# Patient Record
Sex: FEMALE | Race: WHITE | NOT HISPANIC OR LATINO | Employment: OTHER | URBAN - NONMETROPOLITAN AREA
[De-identification: names, ages, dates, MRNs, and addresses within clinical notes are randomized per-mention and may not be internally consistent; named-entity substitution may affect disease eponyms.]

---

## 2019-12-27 ENCOUNTER — TELEPHONE (OUTPATIENT)
Dept: NUCLEAR MEDICINE | Facility: HOSPITAL | Age: 69
End: 2019-12-27

## 2019-12-27 NOTE — TELEPHONE ENCOUNTER
A reminder call was performed on 12/27 for the patients nuclear medicine stress test on 12/30. Patient was given the time, date, location and prep for the exam. Patient understood.

## 2019-12-30 ENCOUNTER — HOSPITAL ENCOUNTER (OUTPATIENT)
Dept: NUCLEAR MEDICINE | Facility: HOSPITAL | Age: 69
Setting detail: NUCLEAR MEDICINE
End: 2019-12-30
Attending: FAMILY MEDICINE
Payer: COMMERCIAL

## 2019-12-30 ENCOUNTER — HOSPITAL ENCOUNTER (OUTPATIENT)
Dept: CARDIOLOGY | Facility: HOSPITAL | Age: 69
Setting detail: NUCLEAR MEDICINE
End: 2019-12-30
Attending: FAMILY MEDICINE
Payer: COMMERCIAL

## 2019-12-30 ENCOUNTER — HOSPITAL ENCOUNTER (OUTPATIENT)
Dept: NUCLEAR MEDICINE | Facility: HOSPITAL | Age: 69
Setting detail: NUCLEAR MEDICINE
Discharge: HOME | End: 2019-12-30
Attending: FAMILY MEDICINE | Admitting: FAMILY MEDICINE
Payer: COMMERCIAL

## 2019-12-30 DIAGNOSIS — Z01.810 PREOP CARDIOVASCULAR EXAM: ICD-10-CM

## 2019-12-30 LAB
CV STRESS MAX HR HE: 68
NUC STRESS EJECTION FRACTION: 69 %
RATE PRESSURE PRODUCT: 7480
STRESS ECHO BASELINE DIASTOLIC HE: 80
STRESS ECHO BASELINE HR: 49
STRESS ECHO BASELINE SYSTOLIC BP: 110
STRESS ECHO CALCULATED PERCENT HR: 46 %
STRESS ECHO LAST STRESS DIASTOLIC BP: 70
STRESS ECHO LAST STRESS SYSTOLIC BP: 110
STRESS ECHO TARGET HR: 149

## 2019-12-30 PROCEDURE — A9500 TC99M SESTAMIBI: HCPCS | Performed by: RADIOLOGY

## 2019-12-30 PROCEDURE — 93018 CV STRESS TEST I&R ONLY: CPT | Performed by: INTERNAL MEDICINE

## 2019-12-30 PROCEDURE — 25000128 H RX IP 250 OP 636: Performed by: INTERNAL MEDICINE

## 2019-12-30 PROCEDURE — 78452 HT MUSCLE IMAGE SPECT MULT: CPT | Mod: TC

## 2019-12-30 PROCEDURE — 93017 CV STRESS TEST TRACING ONLY: CPT | Performed by: INTERNAL MEDICINE

## 2019-12-30 PROCEDURE — 93016 CV STRESS TEST SUPVJ ONLY: CPT | Performed by: INTERNAL MEDICINE

## 2019-12-30 PROCEDURE — 34300033 ZZH RX 343: Performed by: RADIOLOGY

## 2019-12-30 RX ORDER — REGADENOSON 0.08 MG/ML
0.4 INJECTION, SOLUTION INTRAVENOUS ONCE
Status: COMPLETED | OUTPATIENT
Start: 2019-12-30 | End: 2019-12-30

## 2019-12-30 RX ORDER — AMINOPHYLLINE 25 MG/ML
INJECTION, SOLUTION INTRAVENOUS
Status: DISCONTINUED
Start: 2019-12-30 | End: 2019-12-30 | Stop reason: WASHOUT

## 2019-12-30 RX ORDER — CAFFEINE CITRATE 20 MG/ML
SOLUTION INTRAVENOUS
Status: DISCONTINUED
Start: 2019-12-30 | End: 2019-12-30 | Stop reason: WASHOUT

## 2019-12-30 RX ADMIN — REGADENOSON 0.4 MG: 0.08 INJECTION, SOLUTION INTRAVENOUS at 09:07

## 2019-12-30 RX ADMIN — Medication 30 MILLICURIE: at 09:08

## 2019-12-30 RX ADMIN — Medication 10 MILLICURIE: at 07:05

## 2020-02-15 ENCOUNTER — TRANSFERRED RECORDS (OUTPATIENT)
Dept: HEALTH INFORMATION MANAGEMENT | Facility: OTHER | Age: 70
End: 2020-02-15

## 2020-02-17 ENCOUNTER — ANTICOAGULATION THERAPY VISIT (OUTPATIENT)
Dept: FAMILY MEDICINE | Facility: OTHER | Age: 70
End: 2020-02-17

## 2020-02-17 ENCOUNTER — RESULTS ONLY (OUTPATIENT)
Dept: LAB | Age: 70
End: 2020-02-17

## 2020-02-17 ENCOUNTER — TELEPHONE (OUTPATIENT)
Dept: FAMILY MEDICINE | Facility: OTHER | Age: 70
End: 2020-02-17

## 2020-02-17 ENCOUNTER — TRANSFERRED RECORDS (OUTPATIENT)
Dept: HEALTH INFORMATION MANAGEMENT | Facility: OTHER | Age: 70
End: 2020-02-17

## 2020-02-17 ENCOUNTER — MEDICAL CORRESPONDENCE (OUTPATIENT)
Dept: HEALTH INFORMATION MANAGEMENT | Facility: OTHER | Age: 70
End: 2020-02-17

## 2020-02-17 DIAGNOSIS — I26.99 ACUTE PULMONARY EMBOLISM WITHOUT ACUTE COR PULMONALE, UNSPECIFIED PULMONARY EMBOLISM TYPE (H): Primary | ICD-10-CM

## 2020-02-17 DIAGNOSIS — I74.9 EMBOLISM AND THROMBOSIS (H): ICD-10-CM

## 2020-02-17 LAB
INR PPP: 2.23 (ref 0–1.3)
PROTHROMBIN TIME: 25.9 S (ref 11.9–15.2)

## 2020-02-17 NOTE — TELEPHONE ENCOUNTER
Patient was discharge from [facility] after a fall and sepsis. She was admitted to [facility] and assigned to Dr Patel as primary while there. Patient takes warfarin, protime clinic will need orders to manage patient's warfarin while she is at [facility]. Patient previously managed in [place]. Orders are attached please review and sign. Precious Taylor RN    2/17/2020  2:40 PM

## 2020-02-17 NOTE — PROGRESS NOTES
ANTICOAGULATION FOLLOW-UP CLINIC VISIT    Patient Name:  Liz Rodrigues  Date:  2020  Contact Type:  fax from [facility]ela nurse at [facility]    SUBJECTIVE:  Patient Findings     Positives:   Hospital admission (d/c from [facility] to [facility] facility)             OBJECTIVE    INR   Date Value Ref Range Status   2020 2.23 (H) 0 - 1.3 Final       ASSESSMENT / PLAN  INR assessment THER    Recheck INR In: 3 DAYS    INR Location LT      Anticoagulation Summary  As of 2020    INR goal:   2.0-3.0   TTR:   --   INR used for dosin.23 (2020)   Warfarin maintenance plan:   7.5 mg (5 mg x 1.5) every day   Full warfarin instructions:   7.5 mg every day   Weekly warfarin total:   52.5 mg   Plan last modified:   Precious Taylor, RN (2020)   Next INR check:   2020   Target end date:   Indefinite    Indications    Embolism and thrombosis (H) [I74.9]             Anticoagulation Episode Summary     INR check location:       Preferred lab:       Send INR reminders to:    INR    Comments:         Anticoagulation Care Providers     Provider Role Specialty Phone number    Jose Patel MD [facility] [phone]            See the Encounter Report to view Anticoagulation Flowsheet and Dosing Calendar (Go to Encounters tab in chart review, and find the Anticoagulation Therapy Visit)        Precious Taylor RN

## 2020-02-18 PROBLEM — I26.99 ACUTE PULMONARY EMBOLISM WITHOUT ACUTE COR PULMONALE, UNSPECIFIED PULMONARY EMBOLISM TYPE (H): Status: ACTIVE | Noted: 2020-02-18

## 2020-02-20 ENCOUNTER — TRANSFERRED RECORDS (OUTPATIENT)
Dept: HEALTH INFORMATION MANAGEMENT | Facility: OTHER | Age: 70
End: 2020-02-20

## 2020-02-20 ENCOUNTER — ANTICOAGULATION THERAPY VISIT (OUTPATIENT)
Dept: ANTICOAGULATION | Facility: OTHER | Age: 70
End: 2020-02-20
Payer: COMMERCIAL

## 2020-02-20 ENCOUNTER — MEDICAL CORRESPONDENCE (OUTPATIENT)
Dept: HEALTH INFORMATION MANAGEMENT | Facility: OTHER | Age: 70
End: 2020-02-20

## 2020-02-20 ENCOUNTER — RESULTS ONLY (OUTPATIENT)
Dept: LAB | Age: 70
End: 2020-02-20

## 2020-02-20 DIAGNOSIS — I26.99 ACUTE PULMONARY EMBOLISM WITHOUT ACUTE COR PULMONALE, UNSPECIFIED PULMONARY EMBOLISM TYPE (H): ICD-10-CM

## 2020-02-20 DIAGNOSIS — I74.9 EMBOLISM AND THROMBOSIS (H): ICD-10-CM

## 2020-02-20 LAB
INR PPP: 2.7 (ref 0–1.3)
PROTHROMBIN TIME: 31.4 S (ref 11.9–15.2)

## 2020-02-20 NOTE — PROGRESS NOTES
ANTICOAGULATION FOLLOW-UP CLINIC VISIT    Patient Name:  Liz Rodrigues  Date:  2020  Contact Type:  fax from [place] nurse at [facility]     SUBJECTIVE:  Patient Findings         Clinical Outcomes     Negatives:   Major bleeding event, Thromboembolic event, Anticoagulation-related hospital admission, Anticoagulation-related ED visit, Anticoagulation-related fatality           OBJECTIVE    INR   Date Value Ref Range Status   2020 2.70 (H) 0 - 1.3 Final       ASSESSMENT / PLAN  INR assessment THER    Recheck INR In: 4 DAYS    INR Location [facility]      Anticoagulation Summary  As of 2020    INR goal:   2.0-3.0   TTR:   --   INR used for dosin.70 (2020)   Warfarin maintenance plan:   7.5 mg (5 mg x 1.5) every day   Full warfarin instructions:   7.5 mg every day   Weekly warfarin total:   52.5 mg   No change documented:   Precious Taylor RN   Plan last modified:   Precious Taylor RN (2020)   Next INR check:   2020   Target end date:   Indefinite    Indications    Embolism and thrombosis (H) [I74.9]  Acute pulmonary embolism without acute cor pulmonale  unspecified pulmonary embolism type (H) [I26.99]             Anticoagulation Episode Summary     INR check location:       Preferred lab:       Send INR reminders to:    INR    Comments:   currently at [facility]      Anticoagulation Care Providers     Provider Role Specialty Phone number    Jose Patel MD [facility] [phone]            See the Encounter Report to view Anticoagulation Flowsheet and Dosing Calendar (Go to Encounters tab in chart review, and find the Anticoagulation Therapy Visit)    Patient not here, [facility] Communicationsheet with screening questions and current INR received via FAX from outside agency. Results reviewed, Warfarin dosing per protocol, and recommended follow-up appointment made. Paperwork FAXED back to facility.       Precious Taylor  RN

## 2020-02-24 ENCOUNTER — APPOINTMENT (OUTPATIENT)
Dept: LAB | Facility: OTHER | Age: 70
End: 2020-02-24
Attending: FAMILY MEDICINE
Payer: COMMERCIAL

## 2020-02-24 ENCOUNTER — RESULTS ONLY (OUTPATIENT)
Dept: LAB | Age: 70
End: 2020-02-24

## 2020-02-24 ENCOUNTER — TRANSFERRED RECORDS (OUTPATIENT)
Dept: HEALTH INFORMATION MANAGEMENT | Facility: OTHER | Age: 70
End: 2020-02-24

## 2020-02-24 ENCOUNTER — ANTICOAGULATION THERAPY VISIT (OUTPATIENT)
Dept: ANTICOAGULATION | Facility: OTHER | Age: 70
End: 2020-02-24
Payer: COMMERCIAL

## 2020-02-24 ENCOUNTER — MEDICAL CORRESPONDENCE (OUTPATIENT)
Dept: HEALTH INFORMATION MANAGEMENT | Facility: OTHER | Age: 70
End: 2020-02-24

## 2020-02-24 DIAGNOSIS — I74.9 EMBOLISM AND THROMBOSIS (H): ICD-10-CM

## 2020-02-24 DIAGNOSIS — I26.99 ACUTE PULMONARY EMBOLISM WITHOUT ACUTE COR PULMONALE, UNSPECIFIED PULMONARY EMBOLISM TYPE (H): ICD-10-CM

## 2020-02-24 LAB
INR PPP: 4.15 (ref 0–1.3)
PROTHROMBIN TIME: 48.7 S (ref 11.9–15.2)

## 2020-02-24 NOTE — PROGRESS NOTES
ANTICOAGULATION FOLLOW-UP CLINIC VISIT    Patient Name:  Liz Rodrigues  Date:  2020  Contact Type:  INR [facility] lab/assessment via fax from [facility]    SUBJECTIVE:  Patient Findings     Comments:   Per fax from Gisela Montana LPN, [facility]        Clinical Outcomes     Negatives:   Major bleeding event, Thromboembolic event, Anticoagulation-related hospital admission, Anticoagulation-related ED visit, Anticoagulation-related fatality    Comments:   Per fax from Gisela Montana LPN, [facility]           OBJECTIVE    INR   Date Value Ref Range Status   2020 4.15 (H) 0 - 1.3 Final       ASSESSMENT / PLAN  INR assessment SUPRA    Recheck INR In: 3 DAYS    INR Location [facility]      Anticoagulation Summary  As of 2020    INR goal:   2.0-3.0   TTR:   --   INR used for dosin.15! (2020)   Warfarin maintenance plan:   7.5 mg (5 mg x 1.5) every day   Full warfarin instructions:   : 2.5 mg; : 5 mg; : 5 mg; Otherwise 7.5 mg every day   Weekly warfarin total:   52.5 mg   Plan last modified:   Precious Taylor RN (2020)   Next INR check:   2020   Target end date:   Indefinite    Indications    Embolism and thrombosis (H) [I74.9]  Acute pulmonary embolism without acute cor pulmonale  unspecified pulmonary embolism type (H) [I26.99]             Anticoagulation Episode Summary     INR check location:       Preferred lab:       Send INR reminders to:    INR    Comments:   currently at [facility]      Anticoagulation Care Providers     Provider Role Specialty Phone number    Jose Patel MD [facility] [phone]            See the Encounter Report to view Anticoagulation Flowsheet and Dosing Calendar (Go to Encounters tab in chart review, and find the Anticoagulation Therapy Visit)     No encounter, INR at [facility] lab and assessment received via fax.  Instructions faxed back to RN.      Dorita Peres RN

## 2020-02-27 ENCOUNTER — ANTICOAGULATION THERAPY VISIT (OUTPATIENT)
Dept: ANTICOAGULATION | Facility: OTHER | Age: 70
End: 2020-02-27
Payer: COMMERCIAL

## 2020-02-27 ENCOUNTER — RESULTS ONLY (OUTPATIENT)
Dept: LAB | Age: 70
End: 2020-02-27

## 2020-02-27 ENCOUNTER — MEDICAL CORRESPONDENCE (OUTPATIENT)
Dept: HEALTH INFORMATION MANAGEMENT | Facility: OTHER | Age: 70
End: 2020-02-27

## 2020-02-27 ENCOUNTER — TRANSFERRED RECORDS (OUTPATIENT)
Dept: HEALTH INFORMATION MANAGEMENT | Facility: OTHER | Age: 70
End: 2020-02-27

## 2020-02-27 DIAGNOSIS — I74.9 EMBOLISM AND THROMBOSIS (H): Primary | ICD-10-CM

## 2020-02-27 DIAGNOSIS — I26.99 ACUTE PULMONARY EMBOLISM WITHOUT ACUTE COR PULMONALE, UNSPECIFIED PULMONARY EMBOLISM TYPE (H): ICD-10-CM

## 2020-02-27 LAB
INR PPP: 2.23 (ref 0–1.3)
PROTHROMBIN TIME: 25.8 S (ref 11.9–15.2)

## 2020-02-27 RX ORDER — WARFARIN SODIUM 5 MG/1
TABLET ORAL
Start: 2020-02-27 | End: 2020-03-02

## 2020-02-27 NOTE — PROGRESS NOTES
ANTICOAGULATION FOLLOW-UP CLINIC VISIT    Patient Name:  Liz Rodrigues  Date:  2020  Contact Type:  fax from Gisela nurse with [facility]    SUBJECTIVE:  Patient Findings         Clinical Outcomes     Negatives:   Major bleeding event, Thromboembolic event, Anticoagulation-related hospital admission, Anticoagulation-related ED visit, Anticoagulation-related fatality           OBJECTIVE    INR   Date Value Ref Range Status   2020 2.23 (H) 0 - 1.3 Final       ASSESSMENT / PLAN  INR assessment THER    Recheck INR In: 4 DAYS    INR Location [facility]      Anticoagulation Summary  As of 2020    INR goal:   2.0-3.0   TTR:   --   INR used for dosin.23 (2020)   Warfarin maintenance plan:   7.5 mg (5 mg x 1.5) every Mon, Wed, Fri; 5 mg (5 mg x 1) all other days   Full warfarin instructions:   : 7.5 mg; : 7.5 mg; Otherwise 7.5 mg every Mon, Wed, Fri; 5 mg all other days   Weekly warfarin total:   42.5 mg   Plan last modified:   Precious Taylor RN (2020)   Next INR check:   3/2/2020   Target end date:   Indefinite    Indications    Embolism and thrombosis (H) [I74.9]  Acute pulmonary embolism without acute cor pulmonale  unspecified pulmonary embolism type (H) [I26.99]             Anticoagulation Episode Summary     INR check location:       Preferred lab:       Send INR reminders to:    INR    Comments:   currently at [facility] previously managed by [facility]      Anticoagulation Care Providers     Provider Role Specialty Phone number    Jose Patel MD [facility] [phone]            See the Encounter Report to view Anticoagulation Flowsheet and Dosing Calendar (Go to Encounters tab in chart review, and find the Anticoagulation Therapy Visit)    Patient not here, [employer] Communicationsheet with screening questions and current INR received via FAX from outside agency. Results reviewed, Warfarin dosing per protocol, and recommended follow-up  appointment made. Paperwork FAXED back to facility.       Precious Taylor RN

## 2020-03-02 ENCOUNTER — RESULTS ONLY (OUTPATIENT)
Dept: LAB | Age: 70
End: 2020-03-02

## 2020-03-02 ENCOUNTER — MEDICAL CORRESPONDENCE (OUTPATIENT)
Dept: HEALTH INFORMATION MANAGEMENT | Facility: OTHER | Age: 70
End: 2020-03-02

## 2020-03-02 ENCOUNTER — ANTICOAGULATION THERAPY VISIT (OUTPATIENT)
Dept: ANTICOAGULATION | Facility: OTHER | Age: 70
End: 2020-03-02
Payer: COMMERCIAL

## 2020-03-02 ENCOUNTER — APPOINTMENT (OUTPATIENT)
Dept: LAB | Facility: OTHER | Age: 70
End: 2020-03-02
Attending: FAMILY MEDICINE
Payer: COMMERCIAL

## 2020-03-02 DIAGNOSIS — I26.99 ACUTE PULMONARY EMBOLISM WITHOUT ACUTE COR PULMONALE, UNSPECIFIED PULMONARY EMBOLISM TYPE (H): ICD-10-CM

## 2020-03-02 DIAGNOSIS — I74.9 EMBOLISM AND THROMBOSIS (H): ICD-10-CM

## 2020-03-02 LAB
INR PPP: 3.09 (ref 0–1.3)
PROTHROMBIN TIME: 36 S (ref 11.9–15.2)

## 2020-03-02 RX ORDER — WARFARIN SODIUM 5 MG/1
TABLET ORAL
COMMUNITY
Start: 2020-03-02

## 2020-03-02 NOTE — PROGRESS NOTES
ANTICOAGULATION FOLLOW-UP CLINIC VISIT    Patient Name:  Liz Rodrigues  Date:  3/2/2020  Contact Type:  fax from [facility]    SUBJECTIVE:  Patient Findings     Comments:   Per [facility]        Clinical Outcomes     Negatives:   Major bleeding event, Thromboembolic event, Anticoagulation-related hospital admission, Anticoagulation-related ED visit, Anticoagulation-related fatality    Comments:   Per [facility]           OBJECTIVE    INR   Date Value Ref Range Status   03/02/2020 3.09 (H) 0 - 1.3 Final       ASSESSMENT / PLAN  INR assessment THER    Recheck INR In: 1 WEEK    INR Location [facility]      Anticoagulation Summary  As of 3/2/2020    INR goal:   2.0-3.0   TTR:   86.0 % (4 d)   INR used for dosing:   3.09! (3/2/2020)   Warfarin maintenance plan:   7.5 mg (5 mg x 1.5) every Mon, Fri; 5 mg (5 mg x 1) all other days   Full warfarin instructions:   7.5 mg every Mon, Fri; 5 mg all other days   Weekly warfarin total:   40 mg   Plan last modified:   Dorita Peres RN (3/2/2020)   Next INR check:   3/9/2020   Target end date:   Indefinite    Indications    Embolism and thrombosis (H) [I74.9]  Acute pulmonary embolism without acute cor pulmonale  unspecified pulmonary embolism type (H) [I26.99]             Anticoagulation Episode Summary     INR check location:       Preferred lab:       Send INR reminders to:    INR    Comments:   currently at [facility]      Anticoagulation Care Providers     Provider Role Specialty Phone number    Jose Patel MD [facility] [phone]            See the Encounter Report to view Anticoagulation Flowsheet and Dosing Calendar (Go to Encounters tab in chart review, and find the Anticoagulation Therapy Visit)    No encounter, INR and assessment received via fax.  Instructions faxed back to [facility].      Dorita Peres RN

## 2020-03-09 ENCOUNTER — RESULTS ONLY (OUTPATIENT)
Dept: LAB | Age: 70
End: 2020-03-09

## 2020-03-09 ENCOUNTER — TRANSFERRED RECORDS (OUTPATIENT)
Dept: HEALTH INFORMATION MANAGEMENT | Facility: OTHER | Age: 70
End: 2020-03-09

## 2020-03-09 ENCOUNTER — MEDICAL CORRESPONDENCE (OUTPATIENT)
Dept: HEALTH INFORMATION MANAGEMENT | Facility: OTHER | Age: 70
End: 2020-03-09

## 2020-03-09 ENCOUNTER — ANTICOAGULATION THERAPY VISIT (OUTPATIENT)
Dept: ANTICOAGULATION | Facility: OTHER | Age: 70
End: 2020-03-09
Payer: COMMERCIAL

## 2020-03-09 DIAGNOSIS — I74.9 EMBOLISM AND THROMBOSIS (H): ICD-10-CM

## 2020-03-09 DIAGNOSIS — I26.99 ACUTE PULMONARY EMBOLISM WITHOUT ACUTE COR PULMONALE, UNSPECIFIED PULMONARY EMBOLISM TYPE (H): ICD-10-CM

## 2020-03-09 LAB
INR PPP: 2.83 (ref 0–1.3)
PROTHROMBIN TIME: 33 S (ref 11.9–15.2)

## 2020-03-09 NOTE — PROGRESS NOTES
ANTICOAGULATION FOLLOW-UP CLINIC VISIT    Patient Name:  Liz Rodrigues  Date:  3/9/2020  Contact Type:  fax from the [facility]    SUBJECTIVE:         OBJECTIVE    INR   Date Value Ref Range Status   2020 2.83 (H) 0 - 1.3 Final       ASSESSMENT / PLAN  INR assessment THER    Recheck INR In: 1 WEEK    INR Location [facility]      Anticoagulation Summary  As of 3/9/2020    INR goal:   2.0-3.0   TTR:   73.3 % (1.6 wk)   INR used for dosin.83 (3/9/2020)   Warfarin maintenance plan:   7.5 mg (5 mg x 1.5) every Mon, Fri; 5 mg (5 mg x 1) all other days   Full warfarin instructions:   7.5 mg every Mon, Fri; 5 mg all other days   Weekly warfarin total:   40 mg   No change documented:   Dorita Peres RN   Plan last modified:   Dorita Peres RN (3/2/2020)   Next INR check:   3/16/2020   Target end date:   Indefinite    Indications    Embolism and thrombosis (H) [I74.9]  Acute pulmonary embolism without acute cor pulmonale  unspecified pulmonary embolism type (H) [I26.99]             Anticoagulation Episode Summary     INR check location:       Preferred lab:       Send INR reminders to:    INR    Comments:   currently at [facility] previously managed by [facility]      Anticoagulation Care Providers     Provider Role Specialty Phone number    Jose Patel MD [facility] [phone]            See the Encounter Report to view Anticoagulation Flowsheet and Dosing Calendar (Go to Encounters tab in chart review, and find the Anticoagulation Therapy Visit)    No encounter, INR [facility] lab and assessment received via fax.  Instructions faxed back to [facility].      Dorita Peres RN

## 2020-03-16 ENCOUNTER — TRANSFERRED RECORDS (OUTPATIENT)
Dept: HEALTH INFORMATION MANAGEMENT | Facility: OTHER | Age: 70
End: 2020-03-16

## 2020-03-16 ENCOUNTER — MEDICAL CORRESPONDENCE (OUTPATIENT)
Dept: HEALTH INFORMATION MANAGEMENT | Facility: OTHER | Age: 70
End: 2020-03-16

## 2020-03-16 ENCOUNTER — ANTICOAGULATION THERAPY VISIT (OUTPATIENT)
Dept: FAMILY MEDICINE | Facility: OTHER | Age: 70
End: 2020-03-16
Payer: COMMERCIAL

## 2020-03-16 ENCOUNTER — RESULTS ONLY (OUTPATIENT)
Dept: LAB | Age: 70
End: 2020-03-16

## 2020-03-16 DIAGNOSIS — I26.99 ACUTE PULMONARY EMBOLISM WITHOUT ACUTE COR PULMONALE, UNSPECIFIED PULMONARY EMBOLISM TYPE (H): ICD-10-CM

## 2020-03-16 DIAGNOSIS — I74.9 EMBOLISM AND THROMBOSIS (H): ICD-10-CM

## 2020-03-16 LAB
INR PPP: 2.5 (ref 0–1.3)
PROTHROMBIN TIME: 29 S (ref 11.9–15.2)

## 2020-03-16 NOTE — PROGRESS NOTES
ANTICOAGULATION FOLLOW-UP CLINIC VISIT    Patient Name:  Liz Rodrigues  Date:  3/16/2020  Contact Type:  fax from Gisela nurse at [facility]    SUBJECTIVE:  Patient Findings         Clinical Outcomes     Negatives:   Major bleeding event, Thromboembolic event, Anticoagulation-related hospital admission, Anticoagulation-related ED visit, Anticoagulation-related fatality           OBJECTIVE    INR   Date Value Ref Range Status   2020 2.50 (H) 0 - 1.3 Final       ASSESSMENT / PLAN  INR assessment THER    Recheck INR In: 2 WEEKS    INR Location [facility]      Anticoagulation Summary  As of 3/16/2020    INR goal:   2.0-3.0   TTR:   83.4 % (2.6 wk)   INR used for dosin.50 (3/16/2020)   Warfarin maintenance plan:   7.5 mg (5 mg x 1.5) every Mon, Fri; 5 mg (5 mg x 1) all other days   Full warfarin instructions:   7.5 mg every Mon, Fri; 5 mg all other days   Weekly warfarin total:   40 mg   No change documented:   Precious Taylor RN   Plan last modified:   Dorita Peres RN (3/2/2020)   Next INR check:   3/30/2020   Target end date:   Indefinite    Indications    Embolism and thrombosis (H) [I74.9]  Acute pulmonary embolism without acute cor pulmonale  unspecified pulmonary embolism type (H) [I26.99]             Anticoagulation Episode Summary     INR check location:       Preferred lab:       Send INR reminders to:    INR    Comments:   currently at [facility]      Anticoagulation Care Providers     Provider Role Specialty Phone number    Jose Patel MD [facility] [phone]            See the Encounter Report to view Anticoagulation Flowsheet and Dosing Calendar (Go to Encounters tab in chart review, and find the Anticoagulation Therapy Visit)    Patient not here, [employer] Communicationsheet with screening questions and current INR received via FAX from outside agency. Results reviewed, Warfarin dosing per protocol, and recommended  follow-up appointment made. Paperwork FAXED back to facility.       Precious Taylor RN

## 2020-03-30 ENCOUNTER — ANTICOAGULATION THERAPY VISIT (OUTPATIENT)
Dept: ANTICOAGULATION | Facility: OTHER | Age: 70
End: 2020-03-30
Payer: COMMERCIAL

## 2020-03-30 ENCOUNTER — MEDICAL CORRESPONDENCE (OUTPATIENT)
Dept: HEALTH INFORMATION MANAGEMENT | Facility: OTHER | Age: 70
End: 2020-03-30

## 2020-03-30 ENCOUNTER — RESULTS ONLY (OUTPATIENT)
Dept: LAB | Age: 70
End: 2020-03-30

## 2020-03-30 ENCOUNTER — TRANSFERRED RECORDS (OUTPATIENT)
Dept: HEALTH INFORMATION MANAGEMENT | Facility: OTHER | Age: 70
End: 2020-03-30

## 2020-03-30 DIAGNOSIS — I74.9 EMBOLISM AND THROMBOSIS (H): ICD-10-CM

## 2020-03-30 DIAGNOSIS — I26.99 ACUTE PULMONARY EMBOLISM WITHOUT ACUTE COR PULMONALE, UNSPECIFIED PULMONARY EMBOLISM TYPE (H): ICD-10-CM

## 2020-03-30 LAB
INR PPP: 1.68 (ref 0–1.3)
PROTHROMBIN TIME: 19.3 S (ref 11.9–15.2)

## 2020-03-30 NOTE — PROGRESS NOTES
ANTICOAGULATION FOLLOW-UP CLINIC VISIT    Patient Name:  Liz Rodrigues  Date:  3/30/2020  Contact Type:  fax from Gisela nurse at [facility]    SUBJECTIVE:  Patient Findings         Clinical Outcomes     Negatives:   Major bleeding event, Thromboembolic event, Anticoagulation-related hospital admission, Anticoagulation-related ED visit, Anticoagulation-related fatality           OBJECTIVE    INR   Date Value Ref Range Status   2020 1.68 (H) 0 - 1.3 Final       ASSESSMENT / PLAN  INR assessment SUB    Recheck INR In: 2 WEEKS    INR Location [facility]      Anticoagulation Summary  As of 3/30/2020    INR goal:   2.0-3.0   TTR:   73.7 % (1.1 mo)   INR used for dosin.68! (3/30/2020)   Warfarin maintenance plan:   7.5 mg (5 mg x 1.5) every Mon, Wed, Fri; 5 mg (5 mg x 1) all other days   Full warfarin instructions:   7.5 mg every Mon, Wed, Fri; 5 mg all other days   Weekly warfarin total:   42.5 mg   Plan last modified:   Precious Taylor RN (3/30/2020)   Next INR check:   2020   Target end date:   Indefinite    Indications    Embolism and thrombosis (H) [I74.9]  Acute pulmonary embolism without acute cor pulmonale  unspecified pulmonary embolism type (H) [I26.99]             Anticoagulation Episode Summary     INR check location:       Preferred lab:       Send INR reminders to:    INR    Comments:   currently at [facility] previously managed by [facility]      Anticoagulation Care Providers     Provider Role Specialty Phone number    Jose Patel MD [facility] [phone]            See the Encounter Report to view Anticoagulation Flowsheet and Dosing Calendar (Go to Encounters tab in chart review, and find the Anticoagulation Therapy Visit)    Patient not here, [employer] Communicationsheet with screening questions and current INR received via FAX from outside agency. Results reviewed, Warfarin dosing per protocol, and recommended follow-up appointment made. Paperwork FAXED  back to facility.       Precious Taylor RN

## 2020-04-06 ENCOUNTER — TELEPHONE (OUTPATIENT)
Dept: FAMILY MEDICINE | Facility: OTHER | Age: 70
End: 2020-04-06

## 2020-04-06 NOTE — TELEPHONE ENCOUNTER
"Notified [facility] nurse Antoinette of response per Jose Patel MD and she verbally understood.  She states, \"the resident asks every day why she is not going home, they tell her and she forgets.\"   They will call with any questions or concerns.    Kandice Crow LPN............4/6/2020 12:24 PM    "

## 2020-04-06 NOTE — TELEPHONE ENCOUNTER
Call the nurse there and/or  and have them talk to the pt regarding what goals for discharge are.  Jose Patel MD on 4/6/2020 at 11:21 AM

## 2020-04-13 ENCOUNTER — NURSING HOME VISIT (OUTPATIENT)
Dept: GERIATRICS | Facility: OTHER | Age: 70
End: 2020-04-13
Attending: NURSE PRACTITIONER
Payer: COMMERCIAL

## 2020-04-13 ENCOUNTER — RESULTS ONLY (OUTPATIENT)
Dept: LAB | Age: 70
End: 2020-04-13

## 2020-04-13 ENCOUNTER — ANTICOAGULATION THERAPY VISIT (OUTPATIENT)
Dept: ANTICOAGULATION | Facility: OTHER | Age: 70
End: 2020-04-13
Attending: FAMILY MEDICINE
Payer: COMMERCIAL

## 2020-04-13 ENCOUNTER — MEDICAL CORRESPONDENCE (OUTPATIENT)
Dept: HEALTH INFORMATION MANAGEMENT | Facility: OTHER | Age: 70
End: 2020-04-13

## 2020-04-13 ENCOUNTER — TRANSFERRED RECORDS (OUTPATIENT)
Dept: HEALTH INFORMATION MANAGEMENT | Facility: OTHER | Age: 70
End: 2020-04-13

## 2020-04-13 DIAGNOSIS — I74.9 EMBOLISM AND THROMBOSIS (H): ICD-10-CM

## 2020-04-13 DIAGNOSIS — I26.99 ACUTE PULMONARY EMBOLISM WITHOUT ACUTE COR PULMONALE, UNSPECIFIED PULMONARY EMBOLISM TYPE (H): ICD-10-CM

## 2020-04-13 DIAGNOSIS — F03.90 DEMENTIA WITHOUT BEHAVIORAL DISTURBANCE, UNSPECIFIED DEMENTIA TYPE: Primary | ICD-10-CM

## 2020-04-13 LAB
INR PPP: 2.63 (ref 0–1.3)
PROTHROMBIN TIME: 30.6 S (ref 11.9–15.2)

## 2020-04-13 PROCEDURE — 99315 NF DSCHRG MGMT 30 MIN/LESS: CPT | Performed by: NURSE PRACTITIONER

## 2020-04-13 NOTE — PROGRESS NOTES
ANTICOAGULATION FOLLOW-UP CLINIC VISIT    Patient Name:  Liz Rodrigues  Date:  2020  Contact Type:  fax from [facility]    SUBJECTIVE:  Patient Findings     Comments:   Per fax from Gisela Montana LPN, [facility]          Clinical Outcomes     Negatives:   Major bleeding event, Thromboembolic event, Anticoagulation-related hospital admission, Anticoagulation-related ED visit, Anticoagulation-related fatality    Comments:   Per fax from Gisela Montana LPN, [facility]             OBJECTIVE    INR   Date Value Ref Range Status   2020 2.63 (H) 0 - 1.3 Final       ASSESSMENT / PLAN  INR assessment THER    Recheck INR In: 2 WEEKS    INR Location [facility]      Anticoagulation Summary  As of 2020    INR goal:   2.0-3.0   TTR:   71.5 % (1.5 mo)   INR used for dosin.63 (2020)   Warfarin maintenance plan:   7.5 mg (5 mg x 1.5) every Mon, Wed, Fri; 5 mg (5 mg x 1) all other days   Full warfarin instructions:   7.5 mg every Mon, Wed, Fri; 5 mg all other days   Weekly warfarin total:   42.5 mg   No change documented:   Dorita Peres RN   Plan last modified:   Precious Taylor RN (3/30/2020)   Next INR check:   2020   Target end date:   Indefinite    Indications    Embolism and thrombosis (H) [I74.9]  Acute pulmonary embolism without acute cor pulmonale  unspecified pulmonary embolism type (H) [I26.99]             Anticoagulation Episode Summary     INR check location:       Preferred lab:       Send INR reminders to:    INR    Comments:   currently at [facility]      Anticoagulation Care Providers     Provider Role Specialty Phone number    Jose Patel MD [facility] [phone]            See the Encounter Report to view Anticoagulation Flowsheet and Dosing Calendar (Go to Encounters tab in chart review, and find the Anticoagulation Therapy Visit)    No encounter, INR and assessment received via fax.  Instructions faxed  back to RN.      Dorita Peres RN

## 2020-04-15 NOTE — PROGRESS NOTES
Visit Date:   04/13/2020      CHIEF COMPLAINT:  Discharge.      HISTORY OF PRESENT ILLNESS:  The patient is requesting discharge to assisted living facility.  The patient was admitted to skilled nursing facility on 02/15 from [facility].  She had had a fall within her home and had been trapped between a wall and a toilet.  She developed rhabdomyolysis and acute kidney injury.  She had some right hip pain.  She was found by a neighbor.  She was brought to the hospital and found to have PE.  She was started on warfarin.  She did have an INR earlier today at 2.63.  She was found to have severe cognitive impairment and it was felt that she could not return back home.  She does not have any family, but does have a friend who lives out in [place] who has assumed guardianship.  She states that he is working to get her into an assisted living out in [place], but for the time being she is going to discharge to an assisted living in [place].  She states overall she is doing well.  She has some mild chronic shoulder pain, but is no longer having any right hip pain.  Nursing staff feel that she is ready to transfer to assisted living.  She has completed PT and OT.  The patient does have history of epilepsy.  She is on Keppra 500 mg twice daily.  According to nursing staff, she has not had any breakthrough seizures.       PAST MEDICAL HISTORY, PAST SURGICAL HISTORY, ALLERGIES, MEDICATIONS, RISK FACTORS, SOCIAL HISTORY:  All reviewed.      ADVANCED DIRECTIVES:  CPR.      REVIEW OF SYSTEMS:  A 12-point complete review of systems discussed with nursing staff and resident.  See HPI for positive findings, otherwise unremarkable.      PHYSICAL EXAMINATION:   VITAL SIGNS:  Blood pressure 122/62, pulse 64, respiratory rate is 20, temp 97.3, weight 221 pounds.  SpO2 is 93% on room air.   GENERAL:  Pleasant female in no acute distress.  She does have evidence of dementia.  Speech is fluent; however, she is quite  forgetful and repeats herself.   SKIN:  Pink.   HEENT:  Sclerae nonicteric.  Mucous membranes moist.   NECK:  Supple without adenopathy.  No thyromegaly.   LUNGS:  Fields clear to auscultation throughout.   CARDIOVASCULAR:  Regular rate and rhythm.   ABDOMEN:  Soft and obese, but without masses, tenderness or organomegaly.  No suprapubic tenderness.   EXTREMITIES:  Without edema.      [facility] notes are reviewed and discussed with the patient and nursing staff.      ASSESSMENT:   1.  Dementia.   2.  Epilepsy.   3.  Hypertension.   4.  Pulmonary embolus.   5.  Major depressive disorder.   6.  B12 deficiency.        PLAN:  Okay to discharge to assisted living with current medication and treatment plan.  Discharge paperwork has been completed.         TEJA NUNEZ NP             D: 2020   T: 2020   MT: KIMBERLY      Name:     ATA AGOSTO   MRN:      [id number]        Account:      [id number]   :      1950           Visit Date:   2020      Document: [id number]       cc: Dr. Patel        [facility]

## 2020-04-22 RX ORDER — METOPROLOL SUCCINATE 100 MG/1
TABLET, EXTENDED RELEASE ORAL
Qty: 25 TABLET | Refills: 10 | OUTPATIENT
Start: 2020-04-22

## 2020-04-22 RX ORDER — LEVETIRACETAM 500 MG/1
TABLET ORAL
Qty: 50 TABLET | Refills: 10 | OUTPATIENT
Start: 2020-04-22

## 2020-04-22 RX ORDER — LOSARTAN POTASSIUM 100 MG/1
TABLET ORAL
Qty: 25 TABLET | Refills: 10 | OUTPATIENT
Start: 2020-04-22

## 2020-04-22 RX ORDER — AMLODIPINE BESYLATE 10 MG/1
TABLET ORAL
Qty: 25 TABLET | Refills: 10 | OUTPATIENT
Start: 2020-04-22

## 2020-04-22 RX ORDER — FLUOXETINE 10 MG/1
CAPSULE ORAL
Qty: 25 CAPSULE | Refills: 10 | OUTPATIENT
Start: 2020-04-22

## 2020-04-22 NOTE — TELEPHONE ENCOUNTER
Routing refill request to provider for review/approval because:  Drug not active on patient's medication list      LOV: NH 4/13/2020 patient discharged  Called [facility] pharmacy and informed patient PCP donta crowder.  Informed patient discharged from [facility] to assisted living.    Angie Castro RN on 4/22/2020 at 9:34 AM

## 2020-06-11 ENCOUNTER — ANTICOAGULATION THERAPY VISIT (OUTPATIENT)
Dept: INTERNAL MEDICINE | Facility: OTHER | Age: 70
End: 2020-06-11
Payer: COMMERCIAL

## 2020-06-11 DIAGNOSIS — I26.99 ACUTE PULMONARY EMBOLISM WITHOUT ACUTE COR PULMONALE, UNSPECIFIED PULMONARY EMBOLISM TYPE (H): ICD-10-CM

## 2020-06-11 DIAGNOSIS — I74.9 EMBOLISM AND THROMBOSIS (H): ICD-10-CM

## 2020-06-11 NOTE — PROGRESS NOTES
ANTICOAGULATION FOLLOW-UP CLINIC VISIT    Patient Name:  Liz Rodrigues  Date:  6/11/2020  Contact Type:      SUBJECTIVE:  Patient Findings     Comments:   Patient not longer at [facility] or INR managed through [facility].        Clinical Outcomes     Comments:   Patient not longer at [facility] or INR managed through [facility].           OBJECTIVE    No results for input(s): INR, [id number], [id number], 2AGN, F2 in the last 168 hours.    ASSESSMENT / PLAN  No question data found.  Anticoagulation Summary  As of 6/11/2020    INR goal:   2.0-3.0   TTR:   71.5 % (1.5 mo)   INR used for dosing:      Warfarin maintenance plan:   7.5 mg (5 mg x 1.5) every Mon, Wed, Fri; 5 mg (5 mg x 1) all other days   Full warfarin instructions:   7.5 mg every Mon, Wed, Fri; 5 mg all other days   Weekly warfarin total:   42.5 mg   Plan last modified:   Precious Taylor RN (3/30/2020)   Next INR check:      Target end date:   Indefinite    Indications    Embolism and thrombosis (H) [I74.9]  Acute pulmonary embolism without acute cor pulmonale  unspecified pulmonary embolism type (H) [I26.99]             Anticoagulation Episode Summary     INR check location:       Preferred lab:       Resolved date:   6/11/2020    Resolved reason:   Patient moved    Send INR reminders to:   [facility]    Comments:   currently at [facility] previously managed by [facility]      Anticoagulation Care Providers     Provider Role Specialty Phone number    Jose Patel MD [facility] [phone]            See the Encounter Report to view Anticoagulation Flowsheet and Dosing Calendar (Go to Encounters tab in chart review, and find the Anticoagulation Therapy Visit)        Precious Taylor, RN

## 2022-09-19 ENCOUNTER — NURSING HOME VISIT (OUTPATIENT)
Dept: GERIATRICS | Facility: OTHER | Age: 72
End: 2022-09-19
Payer: COMMERCIAL

## 2022-09-19 VITALS
RESPIRATION RATE: 18 BRPM | HEART RATE: 74 BPM | DIASTOLIC BLOOD PRESSURE: 74 MMHG | SYSTOLIC BLOOD PRESSURE: 134 MMHG | WEIGHT: 222 LBS

## 2022-09-19 DIAGNOSIS — I10 BENIGN ESSENTIAL HTN: ICD-10-CM

## 2022-09-19 DIAGNOSIS — E66.01 SEVERE OBESITY (BMI 35.0-39.9) WITH COMORBIDITY (H): ICD-10-CM

## 2022-09-19 DIAGNOSIS — S82.891D CLOSED FRACTURE OF RIGHT ANKLE WITH ROUTINE HEALING, SUBSEQUENT ENCOUNTER: Primary | ICD-10-CM

## 2022-09-19 DIAGNOSIS — G31.84 MILD COGNITIVE IMPAIRMENT: ICD-10-CM

## 2022-09-19 DIAGNOSIS — G40.409 GRAND MAL SEIZURE (H): ICD-10-CM

## 2022-09-19 DIAGNOSIS — Z86.711 PERSONAL HISTORY OF PULMONARY EMBOLISM: ICD-10-CM

## 2022-09-19 PROBLEM — S82.891A CLOSED RIGHT ANKLE FRACTURE: Status: ACTIVE | Noted: 2022-08-24

## 2022-09-19 PROCEDURE — 99305 1ST NF CARE MODERATE MDM 35: CPT | Performed by: NURSE PRACTITIONER

## 2022-09-19 ASSESSMENT — ENCOUNTER SYMPTOMS
DIARRHEA: 0
DIFFICULTY URINATING: 0
FATIGUE: 0
TROUBLE SWALLOWING: 0
ABDOMINAL PAIN: 0
FEVER: 0
DYSURIA: 0
DIZZINESS: 0
SHORTNESS OF BREATH: 0
HEMATOCHEZIA: 0
CONFUSION: 1
LIGHT-HEADEDNESS: 0
SLEEP DISTURBANCE: 1

## 2022-09-19 NOTE — PROGRESS NOTES
Subjective:  Patient is seen today for initial nurse practitioner evaluation.  She was admitted to [facility] on August 30, 2022 from [facility].  While living at assisted living secondary to underlying mild dementia, she had a fall with evidence of a right distal fibula spiral fracture.  She was placed in a splint.  She required PT and OT therefore was discharged for short-term rehab.  She also has a history of hypertension, pulmonary embolus, severe obesity and grand mal seizure.  Labs of CBC and BMP just prior to hospital discharge were unremarkable.  She has been following with orthopedic provider.  She has follow-up appointment again next week.  At last orthopedic appointment, she was placed in a molded short leg fiberglass cast and continue to be nonweightbearing.  Patient has been receiving oxycodone 1 to 2 tablets twice daily.  She has as needed order for acetaminophen but this is rarely given.  Patient tells me she is not really having much for pain but likes taking the oxycodone at bedtime to help her sleep.    Patient Active Problem List   Diagnosis     Embolism and thrombosis (H)     Acute pulmonary embolism without acute cor pulmonale, unspecified pulmonary embolism type (H)     Closed right ankle fracture     Benign essential HTN     Grand mal seizure (H)     Personal history of pulmonary embolism     Severe obesity (BMI 35.0-39.9) with comorbidity (H)     Mild cognitive impairment     No past medical history on file.  No past surgical history on file.  Social History     Socioeconomic History     Marital status: Single     Spouse name: Not on file     Number of children: Not on file     Years of education: Not on file     Highest education level: Not on file   Occupational History     Not on file   Tobacco Use     Smoking status: Not on file     Smokeless tobacco: Not on file   Substance and Sexual Activity     Alcohol use: Not on file     Drug use: Not on  file     Sexual activity: Not on file   Other Topics Concern     Not on file   Social History Narrative     Not on file     Social Determinants of Health     Financial Resource Strain: Not on file   Food Insecurity: Not on file   Transportation Needs: Not on file   Physical Activity: Not on file   Stress: Not on file   Social Connections: Not on file   Intimate Partner Violence: Not on file   Housing Stability: Not on file     No family history on file.  Patient has no allergy information on record.    Skilled Nursing Facility Medication Record reviewed    End of Life Planning:   Full code    Review of Systems:  Review of Systems   Constitutional: Negative for fatigue and fever.   HENT: Negative for trouble swallowing.    Respiratory: Negative for shortness of breath.    Cardiovascular: Positive for peripheral edema. Negative for chest pain.        Mild chronic dependent edema   Gastrointestinal: Negative for abdominal pain, diarrhea and hematochezia.   Genitourinary: Negative for difficulty urinating and dysuria.   Musculoskeletal: Positive for gait problem.   Neurological: Negative for dizziness and light-headedness.   Psychiatric/Behavioral: Positive for confusion and sleep disturbance.       Objective:   /74   Pulse 74   Resp 18   Wt 100.7 kg (222 lb)   Physical Exam  Pleasant female no acute distress.  Forgetful with at least mild cognitive impairment.  Skin color pink.  Mucous membranes moist.  Neck supple.  Lung fields clear to auscultation throughout.  Cardiovascular regular with 2/6 systolic murmur.  No S3.  Abdomen is soft and obese but without tenderness or palpable masses.  Short cast on right lower extremity.  Left lower extremity with trace dependent edema.    Hospital discharge summary notes, laboratory diagnostic studies are reviewed.  Recent orthopedic follow-up notes reviewed.    Assessment:    ICD-10-CM    1. Closed fracture of right ankle with routine healing, subsequent encounter   S82.891D    2. Benign essential HTN  I10    3. Personal history of pulmonary embolism  Z86.711    4. Severe obesity (BMI 35.0-39.9) with comorbidity (H)  E66.01    5. Grand mal seizure (H)  G40.409    6. Mild cognitive impairment  G31.84        Plan:   Continue to follow with orthopedics.  Patient is admitting that she does not need the oxycodone for pain but likes to take it for sleep.  I would recommend patient's orthopedic provider be updated that patient's pain is improved.  Nursing staff should encourage acetaminophen use over opioid use for pain management.  Patient also needed paperwork completed for Department [employer] and [employer] in regards to her capability to manage her own benefits.  This paperwork is completed at this time.      CHAKA Rose   9/19/2022  4:49 PM

## 2022-09-26 ENCOUNTER — TELEPHONE (OUTPATIENT)
Dept: GERIATRICS | Facility: OTHER | Age: 72
End: 2022-09-26

## 2022-09-26 NOTE — TELEPHONE ENCOUNTER
A call was placed to Anupam Bae PA-C office today in regard to patient being noncompliant with nonweightbearing status.  She has moderate dementia with a recent BIMS of 11/18.  Skilled nursing facility was requesting I evaluate patient for discharge back to assisted living since she has been noncompliant with nonweightbearing and is not able to do physical therapy because of the nonweightbearing status.  Mr. Bae has not been updated by facility of patient's noncompliance.  Mr. Bae was in surgery and I was not able to speak to him but I was able to speak to CAYDEN Benitez who works with him and she will update Mr. Bae that patient has been noncompliant with nonweightbearing status and has moderate dementia.  She has follow-up appointment on September 28, 2022.  I have no plans to discharge patient at this time without further evaluation and management through [facility].

## 2022-09-30 ENCOUNTER — NURSING HOME VISIT (OUTPATIENT)
Dept: GERIATRICS | Facility: OTHER | Age: 72
End: 2022-09-30
Payer: COMMERCIAL

## 2022-09-30 VITALS
WEIGHT: 222 LBS | DIASTOLIC BLOOD PRESSURE: 83 MMHG | HEART RATE: 74 BPM | SYSTOLIC BLOOD PRESSURE: 135 MMHG | TEMPERATURE: 98 F

## 2022-09-30 DIAGNOSIS — I10 BENIGN ESSENTIAL HTN: ICD-10-CM

## 2022-09-30 DIAGNOSIS — E66.01 SEVERE OBESITY (BMI 35.0-39.9) WITH COMORBIDITY (H): ICD-10-CM

## 2022-09-30 DIAGNOSIS — Z86.711 PERSONAL HISTORY OF PULMONARY EMBOLISM: ICD-10-CM

## 2022-09-30 DIAGNOSIS — F03.B0 MODERATE DEMENTIA WITHOUT BEHAVIORAL DISTURBANCE (H): ICD-10-CM

## 2022-09-30 DIAGNOSIS — S82.891D CLOSED FRACTURE OF RIGHT ANKLE WITH ROUTINE HEALING, SUBSEQUENT ENCOUNTER: Primary | ICD-10-CM

## 2022-09-30 DIAGNOSIS — G40.409 GRAND MAL SEIZURE (H): ICD-10-CM

## 2022-09-30 PROCEDURE — 99315 NF DSCHRG MGMT 30 MIN/LESS: CPT | Performed by: NURSE PRACTITIONER

## 2022-09-30 ASSESSMENT — ENCOUNTER SYMPTOMS
CONFUSION: 1
DYSURIA: 0
FEVER: 0
HEMATOCHEZIA: 0
DIZZINESS: 0
DIFFICULTY URINATING: 0
WEAKNESS: 0
FATIGUE: 0
CHEST TIGHTNESS: 0
SHORTNESS OF BREATH: 0
ABDOMINAL PAIN: 0

## 2022-09-30 NOTE — PROGRESS NOTES
Subjective:  Patient is seen today for discharge back to [facility] memory care unit.  Patient was admitted to [facility] for short-term rehab due to nonweightbearing status after right ankle fracture.  She has moderate dementia.  She was noncompliant with nonweightbearing restrictions.  She was seen 2 days ago by orthopedic provider who removed her cast and placed her in a walking boot with orders for weightbearing as tolerated.  X-ray showed no change in alignment based on orthopedic note.  Patient has known history of hypertension, seizure disorder, depression and history of PE.  She has had no significant changes with her health since being at skilled nursing facility.    Patient Active Problem List   Diagnosis     Embolism and thrombosis (H)     Acute pulmonary embolism without acute cor pulmonale, unspecified pulmonary embolism type (H)     Closed right ankle fracture     Benign essential HTN     Grand mal seizure (H)     Personal history of pulmonary embolism     Severe obesity (BMI 35.0-39.9) with comorbidity (H)     Mild cognitive impairment     No past medical history on file.  No past surgical history on file.  Social History     Socioeconomic History     Marital status: Single     Spouse name: Not on file     Number of children: Not on file     Years of education: Not on file     Highest education level: Not on file   Occupational History     Not on file   Tobacco Use     Smoking status: Not on file     Smokeless tobacco: Not on file   Substance and Sexual Activity     Alcohol use: Not on file     Drug use: Not on file     Sexual activity: Not on file   Other Topics Concern     Not on file   Social History Narrative     Not on file     Social Determinants of Health     Financial Resource Strain: Not on file   Food Insecurity: Not on file   Transportation Needs: Not on file   Physical Activity: Not on file   Stress: Not on file   Social Connections: Not on file   Intimate  Partner Violence: Not on file   Housing Stability: Not on file     No family history on file.  Patient has no allergy information on record.    Skilled Nursing Facility Medication Record reviewed    End of Life Planning:  Full Code    Review of Systems:  Review of Systems   Constitutional: Negative for fatigue and fever.   Respiratory: Negative for chest tightness and shortness of breath.    Cardiovascular: Negative for chest pain.   Gastrointestinal: Negative for abdominal pain and hematochezia.   Genitourinary: Negative for difficulty urinating and dysuria.   Neurological: Negative for dizziness and weakness.   Psychiatric/Behavioral: Positive for confusion.       Objective:   /83   Pulse 74   Temp 98  F (36.7  C)   Wt 100.7 kg (222 lb)   Physical Exam  Pleasant female with moderate dementia.  Skin color pink.  Mucous membranes moist.  Neck supple.  Lung fields clear to auscultation.  Cardiovascular regular.  Abdomen is soft and without tenderness or palpable masses.  Left lower extremity with 1+ chronic edema.  She has cast boot on right lower extremity.  She is in a wheelchair.    Recent [facility] orthopedic notes reviewed and discussed.    Assessment:    ICD-10-CM    1. Closed fracture of right ankle with routine healing, subsequent encounter  S82.891D    2. Moderate dementia without behavioral disturbance  F03.90    3. Benign essential HTN  I10    4. Severe obesity (BMI 35.0-39.9) with comorbidity (H)  E66.01    5. Grand mal seizure (H)  G40.409    6. Personal history of pulmonary embolism  Z86.711        Plan:   May discharge back to [facility] memory care unit on current medication and treatment plan.  Prescription provided for front wheeled walker.  She needs to use this with ambulation.  Continue to follow with [facility] orthopedics.  Patient has follow-up appointment in 3 weeks.  Fracture care orders through [facility] orthopedics.  She is not requiring any medication for pain management.   Discontinue oxycodone.    CHAKA Rose   9/30/2022  4:03 PM

## 2022-11-01 DIAGNOSIS — E55.9 VITAMIN D DEFICIENCY: ICD-10-CM

## 2022-11-01 DIAGNOSIS — G47.00 INSOMNIA, UNSPECIFIED TYPE: ICD-10-CM

## 2022-11-01 DIAGNOSIS — G40.409 GRAND MAL SEIZURE (H): Primary | ICD-10-CM

## 2022-11-03 RX ORDER — LEVETIRACETAM 500 MG/1
TABLET ORAL
Qty: 56 TABLET | Refills: 10 | Status: SHIPPED | OUTPATIENT
Start: 2022-11-03 | End: 2023-10-12

## 2022-11-03 RX ORDER — CHOLECALCIFEROL (VITAMIN D3) 25 MCG
TABLET ORAL
Qty: 28 TABLET | Refills: 10 | Status: SHIPPED | OUTPATIENT
Start: 2022-11-03

## 2022-11-03 RX ORDER — TRAZODONE HYDROCHLORIDE 50 MG/1
TABLET, FILM COATED ORAL
Qty: 28 TABLET | Refills: 10 | Status: SHIPPED | OUTPATIENT
Start: 2022-11-03 | End: 2023-10-12

## 2022-11-03 NOTE — TELEPHONE ENCOUNTER
"[facility], [place] sent Rx request for the following:      Vitamin D3 25 MCG (1000 UT) Tablet    Last Prescription Date:   NA  Last Fill Qty/Refills:         CLIFF JAQUEZ    Last Office Visit:            9/19/22  Future Office visit:           GISELE    traZODone HCl 50 MG Tablet    Last Prescription Date:   NA  Last Fill Qty/Refills:         GISELE, R-GISELE      levETIRAcetam 500 MG Tablet    Last Prescription Date:   NA  Last Fill Qty/Refills:         CLIFF JAQUEZ        Attempted to call patient but number is \"unavailable\".     These requests are from outside sources. Patient appears to be under Thu Wilson's care through Nursing home, but medications have not been reconciled. Please review.    Unable to complete prescription refill per RN Medication Refill Policy.     Dannielle Marquez RN .............. 11/3/2022  3:15 PM         "

## 2023-10-04 DIAGNOSIS — G47.00 INSOMNIA, UNSPECIFIED TYPE: ICD-10-CM

## 2023-10-04 DIAGNOSIS — G40.409 GRAND MAL SEIZURE (H): ICD-10-CM

## 2023-10-11 NOTE — TELEPHONE ENCOUNTER
[facility] sent Rx request for the following:      Requested Prescriptions   Pending Prescriptions Disp Refills    levETIRAcetam (KEPPRA) 500 MG tablet [Pharmacy Med Name: levETIRAcetam 500 MG Tablet] 56 tablet 10     Sig: TAKE ONE TABLET BY MOUTH EVERY MORNING AND AT BEDTIME   Last Prescription Date:   11/3/22  Last Fill Qty/Refills:         56, R-10      There is no refill protocol information for this order       traZODone (DESYREL) 50 MG tablet [Pharmacy Med Name: traZODone HCl 50 MG Tablet] 28 tablet 10     Sig: TAKE ONE TABLET BY MOUTH AT BEDTIME   Last Prescription Date:   11/3/22  Last Fill Qty/Refills:         28, R-10      Serotonin Modulators Failed - 10/11/2023  4:16 PM        Failed - Recent (12 mo) or future (30 days) visit within the authorizing provider's specialty     Last Office Visit:              9/30/22 ([facility] Visit, Thu Wilson)   Future Office visit:           None    Unable to complete prescription refill per RN Medication Refill Policy.     Kandice Estrada RN .............. 10/11/2023  4:19 PM

## 2023-10-12 RX ORDER — LEVETIRACETAM 500 MG/1
TABLET ORAL
Qty: 56 TABLET | Refills: 10 | Status: SHIPPED | OUTPATIENT
Start: 2023-10-12

## 2023-10-12 RX ORDER — TRAZODONE HYDROCHLORIDE 50 MG/1
TABLET, FILM COATED ORAL
Qty: 28 TABLET | Refills: 10 | Status: SHIPPED | OUTPATIENT
Start: 2023-10-12